# Patient Record
Sex: MALE
[De-identification: names, ages, dates, MRNs, and addresses within clinical notes are randomized per-mention and may not be internally consistent; named-entity substitution may affect disease eponyms.]

---

## 2024-04-09 ENCOUNTER — NON-APPOINTMENT (OUTPATIENT)
Age: 24
End: 2024-04-09

## 2024-04-09 DIAGNOSIS — S99.922A UNSPECIFIED INJURY OF LEFT FOOT, INITIAL ENCOUNTER: ICD-10-CM

## 2024-04-09 PROBLEM — Z00.00 ENCOUNTER FOR PREVENTIVE HEALTH EXAMINATION: Status: ACTIVE | Noted: 2024-04-09

## 2024-04-09 RX ORDER — AMLODIPINE BESYLATE 10 MG/1
10 TABLET ORAL
Refills: 0 | Status: ACTIVE | COMMUNITY

## 2024-04-11 ENCOUNTER — APPOINTMENT (OUTPATIENT)
Dept: PODIATRY | Facility: CLINIC | Age: 24
End: 2024-04-11
Payer: MEDICAID

## 2024-04-11 VITALS — HEIGHT: 67 IN | BODY MASS INDEX: 28.25 KG/M2 | WEIGHT: 180 LBS

## 2024-04-11 DIAGNOSIS — M79.675 PAIN IN LEFT TOE(S): ICD-10-CM

## 2024-04-11 PROCEDURE — 73620 X-RAY EXAM OF FOOT: CPT

## 2024-04-11 PROCEDURE — 29540 STRAPPING ANKLE &/FOOT: CPT

## 2024-04-11 PROCEDURE — 99204 OFFICE O/P NEW MOD 45 MIN: CPT | Mod: 25

## 2024-04-15 PROBLEM — M79.675 PAIN OF TOE OF LEFT FOOT: Status: ACTIVE | Noted: 2024-04-15

## 2024-04-15 NOTE — HISTORY OF PRESENT ILLNESS
[FreeTextEntry1] : He returns stating that his left foot still hurts.  He states the little toe is still swollen.  He states that the other toes are starting to be a little bit bruised.  He kept the foot taped all week.  He states that that did help.

## 2024-04-15 NOTE — ASSESSMENT
[FreeTextEntry1] : Assessment: Fifth toe fracture, left foot.  Plan: DP and medial oblique weightbearing radiographs were taken of the left foot and revealed a nondisplaced fracture of the head of the proximal phalanx on the left fifth toe.  It is in adequate alignment.  The affected toe was prepped to insure that it was clean and dry.  Pre tape spray may have been used on the foot  as indicated.  Pre-tape adhesive spray provides an extra sticky, waterproof barrier, that helps tapes and strappings stick better to the skin. Perfect for oily skin, sweaty skin, water sports and extreme environments. It can be a game-changer when strapping challenging areas such as sweaty or wet feet & toes. I then applied a basket-weave strapping to the affected toe and metatarsal phalangeal joint for proper alignment of the toe for support and compression to help relieve pressure and symptoms related to the patient's fracture or joint problem.   The strapping can aid recovery by supporting the muscles, tendons, or ligaments around the affected painful area, as well as improving blood flow to the area. Plus, the strapping will reduce pain and improve the gait, preventing further injury from poor alignment.  The strapping will eliminate motion allowing the fracture site to heal better. The strapping also provides proprioceptive feedback. The strapping can reduce the amount of stretching and moving the ligaments do when the patient is weight bearing. This not only gives tissues a better chance to heal, but it also helps prevent further damage. This strapping also minimizes swelling to reduce pressure on the nerves and pressure on the blood vessels that would otherwise slow healing of the fracture. Strapping the toe and metatarsal phalangeal joint provides the external stabilization that stretched ligaments (tissues connecting bone to bone) need while they heal. Most studies show that strapping the toe and metatarsal phalangeal joint decreases the incidence and severity of the affected area.  Besides physical support, strapping can increase biofeedback and increase proprioception, which is a body's ability to know where it is thus aiding in healing and reducing further exacerbation of the pain. The patient was instructed to leave the strapping in place till the next visit.  The patient was advised to keep the strapping dry, but leave it on and dry with a hair dryer if it got wet.   The patient was provided extra tape to reinforce the strapping as the days passed due to wear, but was instructed not to remove the strapping. The patient was further instructed that if the strapping was uncomfortable or causes any problems the office should be notified right away.   The patient was advised to elevate the foot and limit weight-bearing.  The patient was instructed to rest, ice and elevate the foot as much as possible.  PTR:  1 week.

## 2024-04-15 NOTE — PHYSICAL EXAM
[TextEntry] : The left fifth toe is swollen and painful to palpation and manipulation.  There is ecchymosis in the forefoot region of the left foot.  Pain upon palpation is slightly diminished from last week.

## 2024-04-25 ENCOUNTER — APPOINTMENT (OUTPATIENT)
Dept: PODIATRY | Facility: CLINIC | Age: 24
End: 2024-04-25
Payer: MEDICAID

## 2024-04-25 DIAGNOSIS — S92.912A UNSPECIFIED FRACTURE OF LEFT TOE(S), INITIAL ENCOUNTER FOR CLOSED FRACTURE: ICD-10-CM

## 2024-04-25 PROCEDURE — 99214 OFFICE O/P EST MOD 30 MIN: CPT

## 2024-04-29 PROBLEM — S92.912A TOE FRACTURE, LEFT: Status: ACTIVE | Noted: 2024-04-15

## 2024-04-29 NOTE — HISTORY OF PRESENT ILLNESS
[FreeTextEntry1] : Kris Fuentes returns stating his left little toe is doing a lot better.  He has been able to keep the tape and wear his shoe as he was told to wear his shoe.  He thinks that it is doing better.  He complained about his ingrown toenails today.

## 2024-04-29 NOTE — ASSESSMENT
[FreeTextEntry1] : Assessment: Fifth toe fracture, left foot. Ingrown toenail, medial and lateral border of left and right hallux.  Plan: The left fifth toe was prepped to insure that it was clean and dry.  Pre tape spray may have been used on the foot as indicated.  Pre-tape adhesive spray provides an extra sticky, waterproof barrier, that helps tapes and strappings stick better to the skin. Perfect for oily skin, sweaty skin, water sports and extreme environments. It can be a game-changer when strapping challenging areas such as sweaty or wet feet & toes. I then applied a basket-weave strapping to the affected toe and metatarsal phalangeal joint for proper alignment of the toe for support and compression to help relieve pressure and symptoms related to the patient's fracture or joint problem.   The strapping can aid recovery by supporting the muscles, tendons, or ligaments around the affected painful area, as well as improving blood flow to the area. Plus, the strapping will reduce pain and improve the gait, preventing further injury from poor alignment.  The strapping will eliminate motion allowing the fracture site to heal better. The strapping also provides proprioceptive feedback. The strapping can reduce the amount of stretching and moving the ligaments do when the patient is weight bearing. This not only gives tissues a better chance to heal, but it also helps prevent further damage. This strapping also minimizes swelling to reduce pressure on the nerves and pressure on the blood vessels that would otherwise slow healing of the fracture. Strapping the toe and metatarsal phalangeal joint provides the external stabilization that stretched ligaments (tissues connecting bone to bone) need while they heal. Most studies show that strapping the toe and metatarsal phalangeal joint decreases the incidence and severity of the affected area.  Besides physical support, strapping can increase biofeedback and increase proprioception, which is a body's ability to know where it is thus aiding in healing and reducing further exacerbation of the pain. The patient was instructed to leave the strapping in place till the next visit.  The patient was advised to keep the strapping dry, but leave it on and dry with a hair dryer if it got wet.   The patient was provided extra tape to reinforce the strapping as the days passed due to wear, but was instructed not to remove the strapping. The patient was further instructed that if the strapping was uncomfortable or causes any problems the office should be notified right away.   The patient was advised to elevate the foot and limit weight-bearing.  I performed straightback procedures utilizing a 12.7 cm sterile box lock, double spring fine cut back D tip fine tip stainless steel nail splitter removing the medial and lateral borders of both the left and right hallux nails as far back as tolerable and applied Amerigel wound gel with sterile compression dressings. The patient was instructed to start soaking the feet in lukewarm water and Epsom salts, 2 tablespoons per pint for 20 minutes twice per day.  The patient was advised to dry the feet with a clean towel and then apply a sterile dressing to the area for the next 5 days.  I advised the patient to notify the office right away if increased redness, swelling, pain, open wounds or discharge were observed.  PTR:  4 weeks.

## 2024-04-29 NOTE — PHYSICAL EXAM
[TextEntry] : The left fifth toe is no longer ecchymotic.  There was mild swelling present.  Pain upon palpation and manipulation of the toe is present.  The medial and lateral borders of both hallux nails are incurvated, and there is pain upon direct palpation of the nail plates.

## 2024-05-23 ENCOUNTER — APPOINTMENT (OUTPATIENT)
Dept: PODIATRY | Facility: CLINIC | Age: 24
End: 2024-05-23
Payer: MEDICAID

## 2024-05-23 DIAGNOSIS — L03.032 CELLULITIS OF LEFT TOE: ICD-10-CM

## 2024-05-23 PROCEDURE — 10060 I&D ABSCESS SIMPLE/SINGLE: CPT

## 2024-05-28 PROBLEM — L03.032 PARONYCHIA OF TOE OF LEFT FOOT: Status: ACTIVE | Noted: 2024-05-28

## 2024-05-28 NOTE — ASSESSMENT
[FreeTextEntry1] : Assessment: Paronychial infection, left hallux medial aspect.  Plan: I performed an incision and drainage of the paronychia.  No exudate was noted, therefore, no culture was taken.  I curettaged the offending nail border.  I copiously flushed the area with sterile saline and applied Amerigel with a sterile dressing.  Patient was advised to soak the foot is lukewarm water with Epsom salts, 2 tablespoons per pint for 20 minutes twice per day.  I advised the patient to dry the feet with a clean towel and apply sterile dressing to the area for the next 3 days.  I advised the patient to notify the office right away if increased redness, swelling, pain, open wounds or discharge were observed.  PTR:  4 weeks.

## 2024-05-28 NOTE — HISTORY OF PRESENT ILLNESS
[FreeTextEntry1] : Kris Fuentes returns stating that his left great toe has been bothering him more than normal.  He states that it has been starting to look little red, so he came in.

## 2024-05-28 NOTE — PHYSICAL EXAM
[TextEntry] : The medial aspect of the left hallux nail is swollen and erythematous.  It is painful to direct palpation.  No acute discharge or malodor is noted.

## 2024-06-20 ENCOUNTER — APPOINTMENT (OUTPATIENT)
Dept: PODIATRY | Facility: CLINIC | Age: 24
End: 2024-06-20
Payer: MEDICAID

## 2024-06-20 VITALS — WEIGHT: 180 LBS | BODY MASS INDEX: 28.25 KG/M2 | HEIGHT: 67 IN

## 2024-06-20 DIAGNOSIS — L60.0 INGROWING NAIL: ICD-10-CM

## 2024-06-20 PROCEDURE — 99214 OFFICE O/P EST MOD 30 MIN: CPT

## 2024-06-24 PROBLEM — L60.0 INGROWN RIGHT BIG TOENAIL: Status: ACTIVE | Noted: 2024-04-09

## 2024-06-24 PROBLEM — L60.0 INGROWN LEFT BIG TOENAIL: Status: ACTIVE | Noted: 2024-04-09

## 2024-07-30 ENCOUNTER — APPOINTMENT (OUTPATIENT)
Dept: PODIATRY | Facility: CLINIC | Age: 24
End: 2024-07-30
Payer: MEDICAID

## 2024-07-30 DIAGNOSIS — M79.674 PAIN IN RIGHT TOE(S): ICD-10-CM

## 2024-07-30 DIAGNOSIS — L60.0 INGROWING NAIL: ICD-10-CM

## 2024-07-30 DIAGNOSIS — M79.675 PAIN IN LEFT TOE(S): ICD-10-CM

## 2024-07-30 PROCEDURE — 99214 OFFICE O/P EST MOD 30 MIN: CPT

## 2024-08-01 PROBLEM — M79.674 PAIN OF TOE OF RIGHT FOOT: Status: ACTIVE | Noted: 2024-08-01

## 2024-08-01 NOTE — HISTORY OF PRESENT ILLNESS
[FreeTextEntry1] : Kris Fuentes returns stating that he had a little delay in getting here and his nails are hurting.  He states that both toes appear ingrown to him.

## 2024-08-01 NOTE — PHYSICAL EXAM
[TextEntry] : The lateral aspects of both hallux nails are incurvated.  Swelling in the nail folds is present.

## 2024-08-01 NOTE — ASSESSMENT
[FreeTextEntry1] : Assessment: Ingrown toenail, lateral aspect, left and right hallux.  Plan:   I performed straightback procedures utilizing a 12.7 cm sterile box lock, double spring fine cut back D tip fine tip stainless steel nail splitter removing the medial and lateral borders of both the left and right hallux nails as far back as tolerable and applied Amerigel wound gel with sterile compression dressings. The patient was instructed to start soaking the feet in lukewarm water and Epsom salts, 2 tablespoons per pint for 20 minutes twice per day.  The patient was advised to dry the feet with a clean towel and then apply a sterile dressing to the area for the next 5 days.  I advised the patient to notify the office right away if increased redness, swelling, pain, open wounds or discharge were observed.  PTR:  4 weeks.

## 2024-09-03 ENCOUNTER — APPOINTMENT (OUTPATIENT)
Dept: PODIATRY | Facility: CLINIC | Age: 24
End: 2024-09-03
Payer: MEDICAID

## 2024-09-03 DIAGNOSIS — L60.0 INGROWING NAIL: ICD-10-CM

## 2024-09-03 DIAGNOSIS — M79.674 PAIN IN RIGHT TOE(S): ICD-10-CM

## 2024-09-03 DIAGNOSIS — M79.675 PAIN IN LEFT TOE(S): ICD-10-CM

## 2024-09-03 PROCEDURE — 99214 OFFICE O/P EST MOD 30 MIN: CPT

## 2024-09-05 NOTE — ASSESSMENT
[FreeTextEntry1] : Assessment: Ingrown toenail, medial and lateral border, left and right hallux.  Plan: I performed straightback procedures utilizing a 12.7 cm sterile box lock, double spring fine cut back D tip fine tip stainless steel nail splitter removing the medial and lateral borders of both the left and right hallux nails as far back as tolerable and applied Amerigel wound gel with sterile compression dressings. The patient was instructed to start soaking the feet in lukewarm water and Epsom salts, 2 tablespoons per pint for 20 minutes twice per day.  The patient was advised to dry the feet with a clean towel and then apply a sterile dressing to the area for the next 5 days.  I advised the patient to notify the office right away if increased redness, swelling, pain, open wounds or discharge were observed.  PTR:  4 weeks.

## 2024-09-05 NOTE — HISTORY OF PRESENT ILLNESS
[FreeTextEntry1] : Kris Fuentes returns stating both great toes are digging in and hurting. He states that he has noticed them getting worse over the past few days.  He denies seeing any pus or discharge.  He states that they have been bothering him, and they usually feel better after he is here.

## 2024-09-05 NOTE — PHYSICAL EXAM
[TextEntry] : The medial and lateral borders of both hallux nails are incurvated.  Swelling and pain are present.  Pain upon direct palpation of the nail plates is noted.

## 2024-10-01 ENCOUNTER — APPOINTMENT (OUTPATIENT)
Dept: PODIATRY | Facility: CLINIC | Age: 24
End: 2024-10-01
Payer: MEDICAID

## 2024-10-01 DIAGNOSIS — M79.675 PAIN IN LEFT TOE(S): ICD-10-CM

## 2024-10-01 DIAGNOSIS — L60.0 INGROWING NAIL: ICD-10-CM

## 2024-10-01 DIAGNOSIS — M79.674 PAIN IN RIGHT TOE(S): ICD-10-CM

## 2024-10-01 PROCEDURE — 99214 OFFICE O/P EST MOD 30 MIN: CPT

## 2024-10-03 NOTE — ASSESSMENT
[FreeTextEntry1] : Assessment: Ingrown toenail, medial aspect, left and right hallux.  Plan: I performed straightback procedures utilizing a 12.7 cm sterile box lock, double spring fine cut back D tip fine tip stainless steel nail splitter removing the medial and lateral borders of both the left and right hallux nails as far back as tolerable and applied Amerigel wound gel with sterile compression dressings. The patient was instructed to start soaking the feet in lukewarm water and Epsom salts, 2 tablespoons per pint for 20 minutes twice per day.  The patient was advised to dry the feet with a clean towel and then apply a sterile dressing to the area for the next 5 days.  I advised the patient to notify the office right away if increased redness, swelling, pain, open wounds or discharge were observed.  PTR:  1 month.

## 2024-10-03 NOTE — HISTORY OF PRESENT ILLNESS
[FreeTextEntry1] : Kris returns stating his ingrown nails are starting to bother him again.  He states that he notices it because he has been on his feet more.  He states the outsides of the toes are really hurting.

## 2024-10-03 NOTE — PHYSICAL EXAM
[TextEntry] : The medial borders of both hallux nails are incurvated.  There is swelling in the nail folds.  No puss or discharge is noted.  Pain upon direct palpation of the nail plates is present.

## 2024-11-05 ENCOUNTER — APPOINTMENT (OUTPATIENT)
Dept: PODIATRY | Facility: CLINIC | Age: 24
End: 2024-11-05
Payer: MEDICAID

## 2024-11-05 DIAGNOSIS — M79.674 PAIN IN RIGHT TOE(S): ICD-10-CM

## 2024-11-05 DIAGNOSIS — L60.0 INGROWING NAIL: ICD-10-CM

## 2024-11-05 DIAGNOSIS — M79.675 PAIN IN LEFT TOE(S): ICD-10-CM

## 2024-11-05 PROCEDURE — 99214 OFFICE O/P EST MOD 30 MIN: CPT

## 2024-12-10 ENCOUNTER — APPOINTMENT (OUTPATIENT)
Dept: PODIATRY | Facility: CLINIC | Age: 24
End: 2024-12-10
Payer: MEDICAID

## 2024-12-10 DIAGNOSIS — L60.0 INGROWING NAIL: ICD-10-CM

## 2024-12-10 DIAGNOSIS — M79.675 PAIN IN LEFT TOE(S): ICD-10-CM

## 2024-12-10 DIAGNOSIS — M79.674 PAIN IN RIGHT TOE(S): ICD-10-CM

## 2024-12-10 PROCEDURE — 99214 OFFICE O/P EST MOD 30 MIN: CPT

## 2025-01-07 ENCOUNTER — APPOINTMENT (OUTPATIENT)
Dept: PODIATRY | Facility: CLINIC | Age: 25
End: 2025-01-07
Payer: MEDICAID

## 2025-01-07 DIAGNOSIS — M77.52 OTHER ENTHESOPATHY OF LT FOOT AND ANKLE: ICD-10-CM

## 2025-01-07 DIAGNOSIS — M79.672 PAIN IN LEFT FOOT: ICD-10-CM

## 2025-01-07 PROCEDURE — 73630 X-RAY EXAM OF FOOT: CPT | Mod: LT

## 2025-01-07 PROCEDURE — 99214 OFFICE O/P EST MOD 30 MIN: CPT

## 2025-01-09 PROBLEM — M79.672 LEFT FOOT PAIN: Status: ACTIVE | Noted: 2025-01-09

## 2025-01-09 PROBLEM — M77.52 BURSITIS OF LEFT FOOT: Status: ACTIVE | Noted: 2025-01-09

## 2025-02-13 ENCOUNTER — APPOINTMENT (OUTPATIENT)
Dept: PODIATRY | Facility: CLINIC | Age: 25
End: 2025-02-13

## 2025-02-13 DIAGNOSIS — L08.9 LOCAL INFECTION OF THE SKIN AND SUBCUTANEOUS TISSUE, UNSPECIFIED: ICD-10-CM

## 2025-02-13 DIAGNOSIS — L03.032 CELLULITIS OF LEFT TOE: ICD-10-CM

## 2025-02-13 PROCEDURE — 99213 OFFICE O/P EST LOW 20 MIN: CPT | Mod: 25

## 2025-02-13 PROCEDURE — 10060 I&D ABSCESS SIMPLE/SINGLE: CPT | Mod: TA

## 2025-02-17 PROBLEM — L08.9 INFECTION OF LEFT FOOT: Status: ACTIVE | Noted: 2025-02-17

## 2025-03-13 ENCOUNTER — APPOINTMENT (OUTPATIENT)
Dept: PODIATRY | Facility: CLINIC | Age: 25
End: 2025-03-13

## 2025-03-18 ENCOUNTER — APPOINTMENT (OUTPATIENT)
Dept: PODIATRY | Facility: CLINIC | Age: 25
End: 2025-03-18

## 2025-03-28 ENCOUNTER — APPOINTMENT (OUTPATIENT)
Dept: PODIATRY | Facility: CLINIC | Age: 25
End: 2025-03-28
Payer: MEDICAID

## 2025-03-28 DIAGNOSIS — M79.675 PAIN IN LEFT TOE(S): ICD-10-CM

## 2025-03-28 DIAGNOSIS — L60.0 INGROWING NAIL: ICD-10-CM

## 2025-03-28 DIAGNOSIS — M79.674 PAIN IN RIGHT TOE(S): ICD-10-CM

## 2025-03-28 PROCEDURE — 99214 OFFICE O/P EST MOD 30 MIN: CPT

## 2025-04-28 ENCOUNTER — APPOINTMENT (OUTPATIENT)
Dept: PODIATRY | Facility: CLINIC | Age: 25
End: 2025-04-28
Payer: MEDICAID

## 2025-04-28 DIAGNOSIS — L60.0 INGROWING NAIL: ICD-10-CM

## 2025-04-28 DIAGNOSIS — M79.675 PAIN IN LEFT TOE(S): ICD-10-CM

## 2025-04-28 DIAGNOSIS — M79.674 PAIN IN RIGHT TOE(S): ICD-10-CM

## 2025-04-28 PROCEDURE — 99214 OFFICE O/P EST MOD 30 MIN: CPT

## 2025-05-27 ENCOUNTER — APPOINTMENT (OUTPATIENT)
Dept: PODIATRY | Facility: CLINIC | Age: 25
End: 2025-05-27

## 2025-06-06 ENCOUNTER — APPOINTMENT (OUTPATIENT)
Dept: PODIATRY | Facility: CLINIC | Age: 25
End: 2025-06-06
Payer: MEDICAID

## 2025-06-06 PROCEDURE — 99214 OFFICE O/P EST MOD 30 MIN: CPT

## 2025-07-11 ENCOUNTER — APPOINTMENT (OUTPATIENT)
Dept: PODIATRY | Facility: CLINIC | Age: 25
End: 2025-07-11
Payer: MEDICAID

## 2025-07-11 PROCEDURE — 99214 OFFICE O/P EST MOD 30 MIN: CPT

## 2025-08-15 ENCOUNTER — APPOINTMENT (OUTPATIENT)
Dept: PODIATRY | Facility: CLINIC | Age: 25
End: 2025-08-15

## 2025-08-22 ENCOUNTER — APPOINTMENT (OUTPATIENT)
Dept: PODIATRY | Facility: CLINIC | Age: 25
End: 2025-08-22
Payer: MEDICAID

## 2025-08-22 DIAGNOSIS — M79.675 PAIN IN LEFT TOE(S): ICD-10-CM

## 2025-08-22 DIAGNOSIS — M79.674 PAIN IN RIGHT TOE(S): ICD-10-CM

## 2025-08-22 DIAGNOSIS — L60.0 INGROWING NAIL: ICD-10-CM

## 2025-08-22 PROCEDURE — 99214 OFFICE O/P EST MOD 30 MIN: CPT
